# Patient Record
Sex: MALE | URBAN - METROPOLITAN AREA
[De-identification: names, ages, dates, MRNs, and addresses within clinical notes are randomized per-mention and may not be internally consistent; named-entity substitution may affect disease eponyms.]

---

## 2019-03-21 ENCOUNTER — TELEPHONE (OUTPATIENT)
Dept: OPHTHALMOLOGY | Facility: CLINIC | Age: 34
End: 2019-03-21

## 2019-03-22 ENCOUNTER — OFFICE VISIT (OUTPATIENT)
Dept: OPHTHALMOLOGY | Facility: CLINIC | Age: 34
End: 2019-03-22
Attending: OPHTHALMOLOGY
Payer: COMMERCIAL

## 2019-03-22 DIAGNOSIS — H50.112 EXOTROPIA OF LEFT EYE: ICD-10-CM

## 2019-03-22 DIAGNOSIS — H52.223 REGULAR ASTIGMATISM OF BOTH EYES: ICD-10-CM

## 2019-03-22 DIAGNOSIS — R29.891 OCULAR TORTICOLLIS: ICD-10-CM

## 2019-03-22 DIAGNOSIS — H54.3 LOW VISION, BOTH EYES: ICD-10-CM

## 2019-03-22 DIAGNOSIS — E70.329 OCULOCUTANEOUS ALBINISM (H): Primary | ICD-10-CM

## 2019-03-22 DIAGNOSIS — Q14.1 CONGENITAL HYPOPLASIA OF FOVEA CENTRALIS: ICD-10-CM

## 2019-03-22 DIAGNOSIS — H55.01 CONGENITAL NYSTAGMUS: ICD-10-CM

## 2019-03-22 DIAGNOSIS — H21.233 IRIS TRANSILLUMINATION OF BOTH EYES: ICD-10-CM

## 2019-03-22 PROCEDURE — 92081 LIMITED VISUAL FIELD XM: CPT | Mod: ZF | Performed by: OPHTHALMOLOGY

## 2019-03-22 PROCEDURE — 92060 SENSORIMOTOR EXAMINATION: CPT | Mod: ZF | Performed by: OPHTHALMOLOGY

## 2019-03-22 PROCEDURE — G0463 HOSPITAL OUTPT CLINIC VISIT: HCPCS | Mod: 25,ZF

## 2019-03-22 PROCEDURE — 92015 DETERMINE REFRACTIVE STATE: CPT | Mod: ZF

## 2019-03-22 ASSESSMENT — VISUAL ACUITY
OS_CC: 20/40+
OD_CC: 20/40+
OS_CC: 20/60
OD_SC: 20/70
OD_SC+: -1
OD_CC: 20/60
OD_CC+: +
CORRECTION_TYPE: GLASSES
OS_CC+: +
OS_SC: 20/70

## 2019-03-22 ASSESSMENT — REFRACTION
OD_CYLINDER: +2.50
OS_AXIS: 105
OS_SPHERE: +0.50
OD_SPHERE: +0.50
OD_AXIS: 065
OS_CYLINDER: +1.50

## 2019-03-22 ASSESSMENT — REFRACTION_MANIFEST
OS_CYLINDER: +1.75
OD_SPHERE: -0.50
OD_CYLINDER: +2.50
OD_AXIS: 065
OS_SPHERE: -0.75
OS_AXIS: 105

## 2019-03-22 ASSESSMENT — REFRACTION_WEARINGRX
OS_AXIS: 105
OS_SPHERE: -0.75
OD_AXIS: 064
OS_CYLINDER: +1.75
OD_SPHERE: -0.25
OD_CYLINDER: +2.25
SPECS_TYPE: SVL

## 2019-03-22 ASSESSMENT — TONOMETRY
OS_IOP_MMHG: 17
OD_IOP_MMHG: 14
IOP_METHOD: TONOPEN

## 2019-03-22 ASSESSMENT — EXTERNAL EXAM - LEFT EYE: OS_EXAM: WHITE

## 2019-03-22 ASSESSMENT — CONF VISUAL FIELD
OS_NORMAL: 1
OD_NORMAL: 1
METHOD: COUNTING FINGERS

## 2019-03-22 ASSESSMENT — CUP TO DISC RATIO
OS_RATIO: 0.3
OD_RATIO: 0.3

## 2019-03-22 ASSESSMENT — SLIT LAMP EXAM - LIDS
COMMENTS: WHITE
COMMENTS: WHITE

## 2019-03-22 ASSESSMENT — EXTERNAL EXAM - RIGHT EYE: OD_EXAM: WHITE

## 2019-03-22 NOTE — PATIENT INSTRUCTIONS
Continue with full time glasses wear.     Establish with dermatology.    Information on Research Collaboration  Researchers at the [facility] and the [employer] are trying to better understand the vision problems associated with albinism by looking at the eyes of persons with albinism with specialized equipment.  We are letting you know about this project as you or your parent has signed a consent to keep your data in our albinism file.   The study involves taking several pictures of the retina, which is the lining for the back of the eye (nothing ever touches the eye), as well as doing a variety of vision tests to determine how well the participants see.  In addition, study participants can have genetic testing done to determine the genetic cause of their albinism, without cost to them or their insurance, if this has not already been done.    The study takes about 6-7 hours to complete.  Travel costs are reimbursed, and participants are paid $15 per hour.  For more information or to sign up, please call Dr Madrid to be referred. Your contact at [facility] [place] will be June Tracy at [phone] or [email or web address].  A trip to [place] in the summertime is a great get-away!

## 2019-03-22 NOTE — PROGRESS NOTES
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   Not having any specific problems, no concerns.  Wears glasses.  Has been to [facility] to [facility] every 1-2 years for 14 years. Brother also with albinism, vision is slightly worse, but he can drive. Has seen Dr. Madrid and referred Lincoln here.   No prior eye surgeries.  History of Albinism:  Photosensitivity:  Occasionally  Filtering glasses/cap: Occasionally  Nystagmus: yes, gonzález  Visual development: Abnormal: 20/60 vision  Holds near objects closely: Yes  Head posture:  Abnormal: left turn   Hair color at birth: white  Gene testing: Not done  Tan line: No  Use of sunscreen: Yes  History of bruising/easy bleeding/epistaxis: No        History is obtained from the patient.    Primary care: No primary care provider on file.   Referring provider: Referred Self  [facility] [place]  Assessment & Plan   Lincoln Jolley is a 33 year old male who presents with:     Oculocutaneous albinism (H)   Family history of oculocutaneous albinism (brother - Wale follows here - and uncle).  Autosomal recessive inheritance.   - Recommend seeing dermatology for full skin check. I would be happy to place a referral.  - Ultraviolet protection: sunscreen, sunglasses or transitions lenses, hats with brim.     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   - Sensorimotor    Ocular torticollis  Exotropia of left eye  Left face turn of 25-30 degrees. Allows for best visual acuity.   Left exotropia 25 prism diopters appears 40 prism diopters due to positive angle kappa.  Not bothersome to Lincoln.   - Monitor.    Low vision   Regular astigmatism of both eyes  RE 20/60+2  LE 20/60  BE 20/60+1  - Updated glasses prescription provided.   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       Return in about 2 years (around  3/22/2021).    Patient Instructions   Continue with full time glasses wear.     Establish with dermatology.    Information on Research Collaboration  Researchers at the [facility] and the [employer] are trying to better understand the vision problems associated with albinism by looking at the eyes of persons with albinism with specialized equipment.  We are letting you know about this project as you or your parent has signed a consent to keep your data in our albinism file.   The study involves taking several pictures of the retina, which is the lining for the back of the eye (nothing ever touches the eye), as well as doing a variety of vision tests to determine how well the participants see.  In addition, study participants can have genetic testing done to determine the genetic cause of their albinism, without cost to them or their insurance, if this has not already been done.    The study takes about 6-7 hours to complete.  Travel costs are reimbursed, and participants are paid $15 per hour.  For more information or to sign up, please call Dr Madrid to be referred. Your contact at [facility] [place] will be June Tracy at [phone] or [email or web address].  A trip to [place] in the summertime is a great get-away!            Visit Diagnoses & Orders    ICD-10-CM    1. 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 Congenital nystagmus H55.01 Sensorimotor   3. Ocular torticollis R29.891 Sensorimotor   4. Exotropia of left eye H50.10 Sensorimotor   5. Regular astigmatism of both eyes H52.223    6. Low vision, both eyes H54.3    7. 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  I personally obtained the chief complaint(s) and history of present  illness.  I confirmed and edited as necessary the review of systems, past medical/surgical history, family history, social history, and examination findings as documented by others; and I examined the patient myself.  I personally reviewed the relevant tests, images, and reports as documented above.  I formulated and edited as necessary the assessment and plan and discussed the findings and management plan with the patient and family. - Nesha Madrid MD

## 2019-03-22 NOTE — NURSING NOTE
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   Not having any specific problems, no concerns.  Wears glasses.  Has been to [facility]. Brother also with albinism, vision is slightly worse, but he can drive. Has seen Dr. Madrid and referred Lincoln here.   No prior eye surgeries.  History of Albinism:  Photosensitivity:  Occasionally  Filtering glasses/cap: Occasionally  Nystagmus: yes, gonzález  Visual development: Abnormal: 20/60 vision  Holds near objects closely: Yes  Head posture:  Abnormal: left turn   Hair color at birth: white  Gene testing: Not done  Tan line: No  Use of sunscreen: Yes  History of bruising/easy bleeding/epistaxis: No

## 2020-03-13 ENCOUNTER — TELEPHONE (OUTPATIENT)
Dept: OPHTHALMOLOGY | Facility: CLINIC | Age: 35
End: 2020-03-13

## 2020-03-17 ENCOUNTER — TELEPHONE (OUTPATIENT)
Dept: OPHTHALMOLOGY | Facility: CLINIC | Age: 35
End: 2020-03-17

## 2020-06-25 ENCOUNTER — TELEPHONE (OUTPATIENT)
Dept: OPHTHALMOLOGY | Facility: CLINIC | Age: 35
End: 2020-06-25

## 2020-07-23 ENCOUNTER — OFFICE VISIT (OUTPATIENT)
Dept: OPHTHALMOLOGY | Facility: CLINIC | Age: 35
End: 2020-07-23
Payer: COMMERCIAL

## 2020-07-23 DIAGNOSIS — E70.329 OCULOCUTANEOUS ALBINISM (H): Primary | ICD-10-CM

## 2020-07-23 DIAGNOSIS — H52.223 REGULAR ASTIGMATISM OF BOTH EYES: ICD-10-CM

## 2020-07-23 DIAGNOSIS — H50.112 EXOTROPIA OF LEFT EYE: ICD-10-CM

## 2020-07-23 DIAGNOSIS — H54.3 LOW VISION, BOTH EYES: ICD-10-CM

## 2020-07-23 DIAGNOSIS — H55.01 CONGENITAL NYSTAGMUS: ICD-10-CM

## 2020-07-23 ASSESSMENT — TONOMETRY
IOP_METHOD: ICARE
OS_IOP_MMHG: 20
OD_IOP_MMHG: 19

## 2020-07-23 ASSESSMENT — SLIT LAMP EXAM - LIDS
COMMENTS: WHITE
COMMENTS: WHITE

## 2020-07-23 ASSESSMENT — REFRACTION_WEARINGRX
OS_AXIS: 105
SPECS_TYPE: SVL
OD_AXIS: 064
OD_SPHERE: -0.25
OD_CYLINDER: +2.25
OS_CYLINDER: +1.75
OS_SPHERE: -0.75

## 2020-07-23 ASSESSMENT — VISUAL ACUITY
CORRECTION_TYPE: GLASSES
OD_SC: 20/70
OD_CC+: +1
OD_SC+: -1+1
METHOD: SNELLEN - LINEAR
OS_SC+: -1
OD_CC: 20/60
OS_CC: 20/60
OS_SC: 20/70

## 2020-07-23 ASSESSMENT — REFRACTION_MANIFEST
OS_AXIS: 105
OS_CYLINDER: +1.75
OD_AXIS: 065
OS_SPHERE: -0.75
OD_SPHERE: -0.25
OD_CYLINDER: +2.50

## 2020-07-23 ASSESSMENT — CONF VISUAL FIELD
OD_NORMAL: 1
OS_NORMAL: 1
METHOD: COUNTING FINGERS

## 2020-07-23 ASSESSMENT — CUP TO DISC RATIO
OD_RATIO: 0.40
OS_RATIO: 0.40

## 2020-07-23 ASSESSMENT — EXTERNAL EXAM - RIGHT EYE: OD_EXAM: WHITE

## 2020-07-23 NOTE — PROGRESS NOTES
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 Madrid    3.) Astigmatism each eye  -Okay to continue with current glasses for now    Monitor 1 year dilated eye exam, sooner prn    I have confirmed the patient's CC, HPI and reviewed Past Medical History, Past Surgical History, Social History, Family History, Problem List, Medication List and agree with Tech note.     BINH BenoitO MORROS

## 2020-07-23 NOTE — NURSING NOTE
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 Pt feels vision is stable. No pain. No drops. 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    Lew Manning, KIA COT 12:06 PM July 23, 2020

## 2024-01-05 ENCOUNTER — VIRTUAL VISIT (OUTPATIENT)
Dept: URGENT CARE | Facility: CLINIC | Age: 39
End: 2024-01-05
Payer: COMMERCIAL

## 2024-01-05 DIAGNOSIS — R05.1 ACUTE COUGH: Primary | ICD-10-CM

## 2024-01-05 PROCEDURE — 99207 PR NO CHARGE LOS: CPT | Mod: 95

## 2024-01-05 NOTE — PROGRESS NOTES
Lincoln has had a cough that started about 2 or 3 weeks ago.  The cough is much worse at night and overall seems to be getting worse.  He is got a hoarse voice.  Cough is productive of clear mucus.  He was on azithromycin about 2 weeks ago for an ear infection and that did improve but the cough got worse while he was taking the azithromycin.    I recommend that he be seen in person in clinic for further evaluation.  Lung exam will help to determine the next best step.  Certainly possible that the symptoms are viral and will require more time to clear but based on lung exam +/- CXR, albuterol vs tessalon vs prednisone vs antibiotic may be helpful.    Jazmin Jane PA-C  [facility]    NO CHARGE

## 2024-01-07 ENCOUNTER — HEALTH MAINTENANCE LETTER (OUTPATIENT)
Age: 39
End: 2024-01-07

## 2024-02-27 ENCOUNTER — TELEPHONE (OUTPATIENT)
Dept: OPHTHALMOLOGY | Facility: CLINIC | Age: 39
End: 2024-02-27
Payer: COMMERCIAL

## 2024-03-01 ENCOUNTER — TRANSFERRED RECORDS (OUTPATIENT)
Dept: OPHTHALMOLOGY | Facility: CLINIC | Age: 39
End: 2024-03-01

## 2024-03-01 ENCOUNTER — OFFICE VISIT (OUTPATIENT)
Dept: OPHTHALMOLOGY | Facility: CLINIC | Age: 39
End: 2024-03-01
Payer: COMMERCIAL

## 2024-03-01 DIAGNOSIS — H52.203 HYPEROPIA OF BOTH EYES WITH ASTIGMATISM: ICD-10-CM

## 2024-03-01 DIAGNOSIS — E70.329 OCULOCUTANEOUS ALBINISM (H): Primary | ICD-10-CM

## 2024-03-01 DIAGNOSIS — H55.01 CONGENITAL NYSTAGMUS: ICD-10-CM

## 2024-03-01 DIAGNOSIS — H52.03 HYPEROPIA OF BOTH EYES WITH ASTIGMATISM: ICD-10-CM

## 2024-03-01 PROCEDURE — 92081 LIMITED VISUAL FIELD XM: CPT | Performed by: OPTOMETRIST

## 2024-03-01 PROCEDURE — 92004 COMPRE OPH EXAM NEW PT 1/>: CPT | Performed by: OPTOMETRIST

## 2024-03-01 PROCEDURE — 92015 DETERMINE REFRACTIVE STATE: CPT | Performed by: OPTOMETRIST

## 2024-03-01 ASSESSMENT — REFRACTION_WEARINGRX
OD_SPHERE: -0.25
OS_SPHERE: -0.75
OS_AXIS: 105
OD_CYLINDER: +2.50
OD_AXIS: 065
OS_CYLINDER: +1.75

## 2024-03-01 ASSESSMENT — VISUAL ACUITY
METHOD: SNELLEN - LINEAR
OD_PH_SC+: -2
OD_SC: 20/100
OD_SC+: -1+1
OS_SC+: -1
OD_PH_SC: 20/70
OS_SC: 20/80

## 2024-03-01 ASSESSMENT — CONF VISUAL FIELD
OD_INFERIOR_TEMPORAL_RESTRICTION: 0
OD_SUPERIOR_NASAL_RESTRICTION: 0
OD_INFERIOR_NASAL_RESTRICTION: 0
OS_SUPERIOR_NASAL_RESTRICTION: 0
OS_NORMAL: 1
OS_INFERIOR_NASAL_RESTRICTION: 0
OD_SUPERIOR_TEMPORAL_RESTRICTION: 0
OS_SUPERIOR_TEMPORAL_RESTRICTION: 0
OS_INFERIOR_TEMPORAL_RESTRICTION: 0
OD_NORMAL: 1
METHOD: COUNTING FINGERS

## 2024-03-01 ASSESSMENT — SLIT LAMP EXAM - LIDS
COMMENTS: WHITE
COMMENTS: WHITE

## 2024-03-01 ASSESSMENT — REFRACTION_MANIFEST
OD_CYLINDER: +2.75
OS_CYLINDER: +1.75
OS_AXIS: 118
OD_AXIS: 059
OD_SPHERE: -0.25
OS_SPHERE: -0.50

## 2024-03-01 ASSESSMENT — EXTERNAL EXAM - RIGHT EYE: OD_EXAM: WHITE

## 2024-03-01 ASSESSMENT — CUP TO DISC RATIO
OS_RATIO: 0.40
OD_RATIO: 0.40

## 2024-03-01 ASSESSMENT — TONOMETRY
IOP_METHOD: ICARE
OS_IOP_MMHG: 19
OD_IOP_MMHG: 20

## 2024-03-01 NOTE — NURSING NOTE
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   He tells me vision has been stable, only wearing glasses for computer work.   Denies eye pain or discomfort. No flashes or floaters.   No new concerns.     Avel Philip 8:10 AM March 1, 2024

## 2024-03-01 NOTE — PROGRESS NOTES
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     Nadia Mitchell, BINH FAAO FSLS

## 2025-01-25 ENCOUNTER — HEALTH MAINTENANCE LETTER (OUTPATIENT)
Age: 40
End: 2025-01-25